# Patient Record
Sex: FEMALE | Race: OTHER | Employment: UNEMPLOYED | URBAN - METROPOLITAN AREA
[De-identification: names, ages, dates, MRNs, and addresses within clinical notes are randomized per-mention and may not be internally consistent; named-entity substitution may affect disease eponyms.]

---

## 2018-11-12 ENCOUNTER — HOSPITAL ENCOUNTER (OUTPATIENT)
Age: 33
Discharge: HOME | End: 2018-11-12
Attending: FAMILY MEDICINE
Payer: MEDICAID

## 2018-11-12 VITALS
BODY MASS INDEX: 23.92 KG/M2 | HEIGHT: 62 IN | OXYGEN SATURATION: 98 % | DIASTOLIC BLOOD PRESSURE: 62 MMHG | WEIGHT: 130 LBS | TEMPERATURE: 98 F | SYSTOLIC BLOOD PRESSURE: 108 MMHG | RESPIRATION RATE: 18 BRPM | HEART RATE: 78 BPM

## 2018-11-12 DIAGNOSIS — R20.2 NUMBNESS AND TINGLING IN LEFT ARM: Primary | ICD-10-CM

## 2018-11-12 DIAGNOSIS — R20.0 NUMBNESS AND TINGLING IN LEFT ARM: Primary | ICD-10-CM

## 2018-11-12 DIAGNOSIS — F41.9 ANXIETY: ICD-10-CM

## 2018-11-12 PROCEDURE — 93010 ELECTROCARDIOGRAM REPORT: CPT

## 2018-11-12 PROCEDURE — 80047 BASIC METABLC PNL IONIZED CA: CPT

## 2018-11-12 PROCEDURE — 85025 COMPLETE CBC W/AUTO DIFF WBC: CPT | Performed by: FAMILY MEDICINE

## 2018-11-12 PROCEDURE — 81025 URINE PREGNANCY TEST: CPT | Performed by: FAMILY MEDICINE

## 2018-11-12 PROCEDURE — 36415 COLL VENOUS BLD VENIPUNCTURE: CPT

## 2018-11-12 PROCEDURE — 99204 OFFICE O/P NEW MOD 45 MIN: CPT

## 2018-11-12 PROCEDURE — 93005 ELECTROCARDIOGRAM TRACING: CPT

## 2018-11-12 RX ORDER — MONTELUKAST SODIUM 10 MG/1
10 TABLET ORAL
COMMUNITY
Start: 2018-08-22

## 2018-11-12 NOTE — ED PROVIDER NOTES
Patient Seen in: [facility]    History   Patient presents with:  Dyspnea JULIO SOB (respiratory)    Stated Complaint: left arm tingling shortness of breathe nausea    HPI    This 27-year-old female presents to the office with comp normal.  EARS: Tympanic membranes normal, EAC's normal.  NOSE: Turbinates normal, no bleeding noted. PHARYNX:  No eythema or exudates, tonsils normal size, airway patent, uvula midline  NECK:  No cervical lymphadenopathy.  No thyromegaly,  HEART: Regular r appointment as soon as possible for a visit in 3 days  If symptoms worsen        Medications Prescribed:  Current Discharge Medication List         Your laboratory and EKG are completely normal.  I suspect that your symptoms are related to some hyperventil

## 2018-11-12 NOTE — ED INITIAL ASSESSMENT (HPI)
C/O left arm numbness that occurred when she was yelling at her kids. Sts that now her arm feels heavy. She also felt an itchy sensation in her throat, which she has had before.

## 2019-02-18 ENCOUNTER — HOSPITAL ENCOUNTER (OUTPATIENT)
Age: 34
Discharge: HOME | End: 2019-02-18
Attending: EMERGENCY MEDICINE
Payer: MEDICAID

## 2019-02-18 ENCOUNTER — APPOINTMENT (OUTPATIENT)
Dept: CT IMAGING | Age: 34
End: 2019-02-18
Attending: EMERGENCY MEDICINE
Payer: MEDICAID

## 2019-02-18 VITALS
HEIGHT: 62 IN | RESPIRATION RATE: 18 BRPM | TEMPERATURE: 98 F | WEIGHT: 132 LBS | DIASTOLIC BLOOD PRESSURE: 68 MMHG | BODY MASS INDEX: 24.29 KG/M2 | OXYGEN SATURATION: 99 % | HEART RATE: 82 BPM | SYSTOLIC BLOOD PRESSURE: 113 MMHG

## 2019-02-18 DIAGNOSIS — R10.9 ABDOMINAL PAIN OF UNKNOWN ETIOLOGY: Primary | ICD-10-CM

## 2019-02-18 LAB
#MXD IC: 1.2 X10ˆ3/UL (ref 0.1–1)
CREAT SERPL-MCNC: 0.6 MG/DL (ref 0.55–1.02)
GLUCOSE BLD-MCNC: 103 MG/DL (ref 70–99)
HCT VFR BLD AUTO: 39.9 % (ref 35–48)
HGB BLD-MCNC: 12.9 G/DL (ref 12–16)
ISTAT BUN: 11 MG/DL (ref 8–20)
ISTAT CHLORIDE: 103 MMOL/L (ref 101–111)
ISTAT HEMATOCRIT: 40 % (ref 34–50)
ISTAT IONIZED CALCIUM: 1.22 MMOL/L
ISTAT POTASSIUM: 3.7 MMOL/L (ref 3.6–5.1)
ISTAT SODIUM: 140 MMOL/L (ref 136–144)
LYMPHOCYTES # BLD AUTO: 2.6 X10ˆ3/UL (ref 1–4)
LYMPHOCYTES NFR BLD AUTO: 27.5 %
MCH RBC QN AUTO: 28.5 PG (ref 26–34)
MCHC RBC AUTO-ENTMCNC: 32.3 G/DL (ref 31–37)
MCV RBC AUTO: 88.1 FL (ref 80–100)
MIXED CELL %: 13 %
NEUTROPHILS # BLD AUTO: 5.5 X10ˆ3/UL (ref 1.5–7.7)
NEUTROPHILS NFR BLD AUTO: 59.5 %
PLATELET # BLD AUTO: 336 X10ˆ3/UL (ref 150–450)
POCT BILIRUBIN URINE: NEGATIVE
POCT GLUCOSE URINE: NEGATIVE MG/DL
POCT KETONE URINE: NEGATIVE MG/DL
POCT LEUKOCYTE ESTERASE URINE: NEGATIVE
POCT NITRITE URINE: NEGATIVE
POCT PH URINE: 6.5 (ref 5–8)
POCT PROTEIN URINE: NEGATIVE MG/DL
POCT SPECIFIC GRAVITY URINE: 1.02
POCT URINE CLARITY: CLEAR
POCT URINE COLOR: YELLOW
POCT URINE PREGNANCY: NEGATIVE
POCT UROBILINOGEN URINE: 0.2 MG/DL
RBC # BLD AUTO: 4.53 X10ˆ6/UL (ref 3.8–5.3)
WBC # BLD AUTO: 9.3 X10ˆ3/UL (ref 4–11)

## 2019-02-18 PROCEDURE — 99214 OFFICE O/P EST MOD 30 MIN: CPT

## 2019-02-18 PROCEDURE — 80047 BASIC METABLC PNL IONIZED CA: CPT

## 2019-02-18 PROCEDURE — 81002 URINALYSIS NONAUTO W/O SCOPE: CPT

## 2019-02-18 PROCEDURE — 85025 COMPLETE CBC W/AUTO DIFF WBC: CPT | Performed by: EMERGENCY MEDICINE

## 2019-02-18 PROCEDURE — 81025 URINE PREGNANCY TEST: CPT

## 2019-02-18 PROCEDURE — 99215 OFFICE O/P EST HI 40 MIN: CPT

## 2019-02-18 PROCEDURE — 74177 CT ABD & PELVIS W/CONTRAST: CPT | Performed by: EMERGENCY MEDICINE

## 2019-02-18 RX ORDER — PANTOPRAZOLE SODIUM 40 MG/1
40 TABLET, DELAYED RELEASE ORAL DAILY
Qty: 30 TABLET | Refills: 0 | Status: SHIPPED | OUTPATIENT
Start: 2019-02-18 | End: 2019-03-20

## 2019-02-19 NOTE — ED PROVIDER NOTES
Patient Seen in: [facility]    History   Patient presents with:  Abdominal Pain    Stated Complaint: stomach burning x 3 days    HPI    59-year-old female presents with abdominal pain.   She reports a burning sensation to the epi Sclerae anicteric. Conjunctivae show no pallor. Oropharynx clear, mucous membranes moist   Neck: supple, no rigidity   Lungs: good air exchange and clear   Heart: regular rate rhythm and no murmur   Abdomen:  Moderate tenderness on palpation of the epigas CONTRAST USED:  100cc of Omnipaque 350  FINDINGS: LUNG BASE:  Unremarkable. LIVER:  Unremarkable. BILIARY:  Unremarkable. SPLEEN:  Unremarkable. PANCREAS:  Unremarkable. ADRENALS:  Unremarkable. KIDNEYS:  Unremarkable. AORTA/VASCULAR:  Unremarkable.  RETROP information for further workup. She is nontoxic-appearing. Labs and CT scan unremarkable. Patient has symptoms that appear consistent with chronic gastritis. I will prescribe Protonix as the ranitidine does not seem to be working for her.   I have als

## 2019-02-19 NOTE — ED INITIAL ASSESSMENT (HPI)
C/o mid abdominal pain characterized as burning the past 6 months and the past 3 days got worse. Tried taking Ranitidine and Prilosec no relief. Denies nausea/vomiting/diarrhea.  Hx of taking Ibuprofen 800 MG three time a day the past year for knee and wris

## 2022-06-15 ENCOUNTER — HOSPITAL ENCOUNTER (OUTPATIENT)
Age: 37
Discharge: HOME | End: 2022-06-15
Payer: MEDICAID

## 2022-06-15 ENCOUNTER — APPOINTMENT (OUTPATIENT)
Dept: GENERAL RADIOLOGY | Age: 37
End: 2022-06-15
Attending: NURSE PRACTITIONER
Payer: MEDICAID

## 2022-06-15 VITALS
HEART RATE: 72 BPM | WEIGHT: 139 LBS | RESPIRATION RATE: 18 BRPM | BODY MASS INDEX: 25.58 KG/M2 | HEIGHT: 62 IN | SYSTOLIC BLOOD PRESSURE: 123 MMHG | OXYGEN SATURATION: 99 % | DIASTOLIC BLOOD PRESSURE: 75 MMHG | TEMPERATURE: 98 F

## 2022-06-15 DIAGNOSIS — S89.91XA INJURY OF RIGHT KNEE, INITIAL ENCOUNTER: Primary | ICD-10-CM

## 2022-06-15 PROCEDURE — 99213 OFFICE O/P EST LOW 20 MIN: CPT

## 2022-06-15 PROCEDURE — 73562 X-RAY EXAM OF KNEE 3: CPT | Performed by: NURSE PRACTITIONER

## 2022-06-15 NOTE — ED INITIAL ASSESSMENT (HPI)
Pt c/o R knee pain over the patella x1 week, pt denies injury but was moving furniture last week, pain goes down into ankle

## 2022-09-19 ENCOUNTER — HOSPITAL ENCOUNTER (OUTPATIENT)
Age: 37
Discharge: HOME | End: 2022-09-19
Attending: EMERGENCY MEDICINE

## 2022-09-19 ENCOUNTER — APPOINTMENT (OUTPATIENT)
Dept: GENERAL RADIOLOGY | Age: 37
End: 2022-09-19
Attending: EMERGENCY MEDICINE

## 2022-09-19 VITALS
RESPIRATION RATE: 16 BRPM | WEIGHT: 135 LBS | DIASTOLIC BLOOD PRESSURE: 63 MMHG | HEIGHT: 62 IN | HEART RATE: 86 BPM | SYSTOLIC BLOOD PRESSURE: 108 MMHG | BODY MASS INDEX: 24.84 KG/M2 | TEMPERATURE: 98 F | OXYGEN SATURATION: 97 %

## 2022-09-19 DIAGNOSIS — M25.559 HIP PAIN: ICD-10-CM

## 2022-09-19 DIAGNOSIS — G62.9 NEUROPATHY: Primary | ICD-10-CM

## 2022-09-19 PROCEDURE — 73502 X-RAY EXAM HIP UNI 2-3 VIEWS: CPT | Performed by: EMERGENCY MEDICINE

## 2022-09-19 PROCEDURE — 99213 OFFICE O/P EST LOW 20 MIN: CPT

## 2022-09-19 RX ORDER — METHYLPREDNISOLONE 4 MG/1
TABLET ORAL
Qty: 1 EACH | Refills: 0 | Status: SHIPPED | OUTPATIENT
Start: 2022-09-19

## 2022-09-19 RX ORDER — HYDROCODONE BITARTRATE AND ACETAMINOPHEN 5; 325 MG/1; MG/1
1-2 TABLET ORAL EVERY 6 HOURS PRN
Qty: 10 TABLET | Refills: 0 | Status: SHIPPED | OUTPATIENT
Start: 2022-09-19 | End: 2022-09-24

## 2022-09-19 NOTE — ED INITIAL ASSESSMENT (HPI)
States on Fri morning stood up from couch after falling asleep, pt c/o upper outer thigh pain. Having difficulty walking due to pain.

## 2023-12-12 ENCOUNTER — HOSPITAL ENCOUNTER (OUTPATIENT)
Age: 38
Discharge: HOME | End: 2023-12-12
Attending: EMERGENCY MEDICINE
Payer: MEDICAID

## 2023-12-12 VITALS
DIASTOLIC BLOOD PRESSURE: 80 MMHG | BODY MASS INDEX: 25 KG/M2 | HEART RATE: 58 BPM | TEMPERATURE: 98 F | WEIGHT: 134.5 LBS | SYSTOLIC BLOOD PRESSURE: 116 MMHG | RESPIRATION RATE: 20 BRPM | OXYGEN SATURATION: 100 %

## 2023-12-12 DIAGNOSIS — J06.9 VIRAL URI WITH COUGH: Primary | ICD-10-CM

## 2023-12-12 PROCEDURE — 99213 OFFICE O/P EST LOW 20 MIN: CPT

## 2023-12-12 RX ORDER — METHYLPREDNISOLONE 4 MG/1
TABLET ORAL
Qty: 1 EACH | Refills: 0 | Status: SHIPPED | OUTPATIENT
Start: 2023-12-12

## 2023-12-12 RX ORDER — BENZONATATE 100 MG/1
100 CAPSULE ORAL 3 TIMES DAILY PRN
Qty: 30 CAPSULE | Refills: 0 | Status: SHIPPED | OUTPATIENT
Start: 2023-12-12 | End: 2024-01-11

## 2023-12-12 NOTE — DISCHARGE INSTRUCTIONS
Follow-up with your primary care doctor  Use albuterol inhaler 2 puffs every 4 hours as needed  Take Tessalon Perles 3 times a day as needed for cough   take Medrol Dosepak as directed  Continue Tylenol or Motrin as needed for fever every 4 hours  Return if any worsening symptoms or new concerns

## 2024-04-30 ENCOUNTER — APPOINTMENT (OUTPATIENT)
Dept: ULTRASOUND IMAGING | Age: 39
End: 2024-04-30
Attending: EMERGENCY MEDICINE
Payer: MEDICAID

## 2024-04-30 ENCOUNTER — APPOINTMENT (OUTPATIENT)
Dept: CT IMAGING | Age: 39
End: 2024-04-30
Attending: EMERGENCY MEDICINE
Payer: MEDICAID

## 2024-04-30 ENCOUNTER — HOSPITAL ENCOUNTER (OUTPATIENT)
Age: 39
Discharge: HOME | End: 2024-04-30
Attending: EMERGENCY MEDICINE
Payer: MEDICAID

## 2024-04-30 VITALS
TEMPERATURE: 99 F | DIASTOLIC BLOOD PRESSURE: 68 MMHG | OXYGEN SATURATION: 98 % | BODY MASS INDEX: 25.76 KG/M2 | SYSTOLIC BLOOD PRESSURE: 108 MMHG | HEART RATE: 84 BPM | WEIGHT: 140 LBS | RESPIRATION RATE: 17 BRPM | HEIGHT: 62 IN

## 2024-04-30 DIAGNOSIS — R10.9 ABDOMINAL PAIN, RIGHT LATERAL: Primary | ICD-10-CM

## 2024-04-30 LAB
#MXD IC: 0.6 X10ˆ3/UL (ref 0.1–1)
B-HCG UR QL: NEGATIVE
BILIRUB UR QL STRIP: NEGATIVE
BUN BLD-MCNC: 12 MG/DL (ref 7–18)
CHLORIDE BLD-SCNC: 102 MMOL/L (ref 98–112)
CLARITY UR: CLEAR
CO2 BLD-SCNC: 25 MMOL/L (ref 21–32)
COLOR UR: YELLOW
CREAT BLD-MCNC: 0.5 MG/DL
EGFRCR SERPLBLD CKD-EPI 2021: 124 ML/MIN/1.73M2 (ref 60–?)
GLUCOSE BLD-MCNC: 104 MG/DL (ref 70–99)
GLUCOSE UR STRIP-MCNC: NEGATIVE MG/DL
HCT VFR BLD AUTO: 42 %
HCT VFR BLD CALC: 43 %
HGB BLD-MCNC: 13.6 G/DL
ISTAT IONIZED CALCIUM FOR CHEM 8: 1.25 MMOL/L (ref 1.12–1.32)
KETONES UR STRIP-MCNC: NEGATIVE MG/DL
LEUKOCYTE ESTERASE UR QL STRIP: NEGATIVE
LYMPHOCYTES # BLD AUTO: 3.2 X10ˆ3/UL (ref 1–4)
LYMPHOCYTES NFR BLD AUTO: 30.3 %
MCH RBC QN AUTO: 28.4 PG (ref 26–34)
MCHC RBC AUTO-ENTMCNC: 32.4 G/DL (ref 31–37)
MCV RBC AUTO: 87.7 FL (ref 80–100)
MIXED CELL %: 6 %
NEUTROPHILS # BLD AUTO: 6.7 X10ˆ3/UL (ref 1.5–7.7)
NEUTROPHILS NFR BLD AUTO: 63.7 %
NITRITE UR QL STRIP: NEGATIVE
PH UR STRIP: 6 [PH]
PLATELET # BLD AUTO: 453 X10ˆ3/UL (ref 150–450)
POTASSIUM BLD-SCNC: 4 MMOL/L (ref 3.6–5.1)
PROT UR STRIP-MCNC: NEGATIVE MG/DL
RBC # BLD AUTO: 4.79 X10ˆ6/UL
SODIUM BLD-SCNC: 138 MMOL/L (ref 136–145)
SP GR UR STRIP: 1.02
UROBILINOGEN UR STRIP-ACNC: <2 MG/DL
WBC # BLD AUTO: 10.5 X10ˆ3/UL (ref 4–11)

## 2024-04-30 PROCEDURE — 99214 OFFICE O/P EST MOD 30 MIN: CPT

## 2024-04-30 PROCEDURE — 81002 URINALYSIS NONAUTO W/O SCOPE: CPT

## 2024-04-30 PROCEDURE — 76700 US EXAM ABDOM COMPLETE: CPT | Performed by: EMERGENCY MEDICINE

## 2024-04-30 PROCEDURE — 85025 COMPLETE CBC W/AUTO DIFF WBC: CPT | Performed by: EMERGENCY MEDICINE

## 2024-04-30 PROCEDURE — 80047 BASIC METABLC PNL IONIZED CA: CPT

## 2024-04-30 PROCEDURE — 81025 URINE PREGNANCY TEST: CPT

## 2024-04-30 PROCEDURE — 74176 CT ABD & PELVIS W/O CONTRAST: CPT | Performed by: EMERGENCY MEDICINE

## 2024-04-30 PROCEDURE — 36415 COLL VENOUS BLD VENIPUNCTURE: CPT

## 2024-04-30 NOTE — ED INITIAL ASSESSMENT (HPI)
Pt began 3 days ago with rt sided under the rib pain that is painful in her abdomen and radiates to her back, and she feels bloated,  She has had 1 bout of vomiting

## 2024-04-30 NOTE — ED PROVIDER NOTES
Patient Seen in: [facility]      History     Chief Complaint   Patient presents with    Abdomen/Flank Pain     Stated Complaint: Side pain, lower back, urinary symptoms    Subjective:   HPI    Patient reports that symptoms started on Saturday.  Patient notes that she ate greasy pizza on Friday.  She thought her symptoms could be related to this.  She has never had similar discomforts in the past.  She does have a family history of gallbladder issues and a sister.  Patient complains of right-sided abdominal pain and points to the right mid abdomen at the anterior lateral aspect of the abdomen as location of discomfort.  She states that she began to feel some of the discomfort into her right flank yesterday.  There is no nausea associate with her symptoms.  She had an episode of emesis yesterday but attributes this to not having coffee which causes her to have headaches which cause her to vomit.  No diarrhea.  Patient did have some discomfort with urination just this morning.  No extraordinary urgency or frequency.  No rash in the area of her discomfort.  No chest pain.  No cough.  No painful breathing.  No fever    Objective:   Past Medical History:    Extrinsic asthma, unspecified              Past Surgical History:   Procedure Laterality Date    Sinus surgery    10/30/2013                Social History     Socioeconomic History    Marital status:    Tobacco Use    Smoking status: Never    Smokeless tobacco: Never   Vaping Use    Vaping status: Never Used   Substance and Sexual Activity    Alcohol use: Yes     Comment: occasional    Drug use: Never     Social Determinants of Health      Received from [facility], [facility]    Social Connections    Received from [facility], [facility]    Housing Stability              Review of Systems    Positive for stated complaint: Side pain, lower back, urinary  symptoms  Other systems are as noted in HPI.  Constitutional and vital signs reviewed.      All other systems reviewed and negative except as noted above.    Physical Exam     ED Triage Vitals [04/30/24 1739]   /67   Pulse 85   Resp 18   Temp 99.2 °F (37.3 °C)   Temp src Temporal   SpO2 97 %   O2 Device None (Room air)       Current:/67   Pulse 85   Temp 99.2 °F (37.3 °C) (Temporal)   Resp 18   Ht 157.5 cm (5' 2\")   Wt 63.5 kg   LMP 04/09/2024   SpO2 97%   BMI 25.61 kg/m²         Physical Exam  General: The patient is awake, alert, conversant.   Eyes: sclera white.  Lids and lashes are normal.  Heart: Normal S1 and S2, without murmur.   Abdomen: Soft, nondistended.  Tenderness noted in the right mid anterior lateral abdomen.  This area of tenderness seems well superior to McBurney's but not quite in the right upper quadrant.  No extraordinary CVA tenderness noted.  Skin: No rash in area patient discomfort  Neurologic:  Mental status as above.  Patient moves all extremities with good strength and coordination.           ED Course     Labs Reviewed   POCT CBC - Abnormal; Notable for the following components:       Result Value    PLT .0 (*)     All other components within normal limits   [facility] POCT URINALYSIS DIPSTICK - Abnormal; Notable for the following components:    Blood, Urine Trace-Intact (*)     All other components within normal limits   POCT ISTAT CHEM8 CARTRIDGE - Abnormal; Notable for the following components:    ISTAT Glucose 104 (*)     ISTAT Creatinine 0.50 (*)     All other components within normal limits   POCT PREGNANCY URINE - Normal                      [facility]     Patient complaining of right mid abdominal pain.  Patient notes symptoms started the day after eating greasy pizza and there is a family history of gallbladder disease.  Biliary colic include the differential.  No family history of kidney stones.  There is some pain in the flank however suggesting this is a  possibility.  Patient also reported some mild dysuria and pyelonephritis include the differential.    Urine dip shows high specific gravity but negative nitrites and leukocytes.  Trace blood noted.  Urine pregnancy negative  I-STAT shows normal electrolytes and creatinine  Hemogram shows normal white count, hemoglobin, and thrombocytosis requiring follow-up    Abdominal ultrasound  CONCLUSION:    1. Probable nonobstructing 4 mm calculus in the right lower pole.  No hydronephrosis.   2. Hepatic steatosis.   LIVER:  Increased echogenicity of the liver.  No focal lesion identified.   BILIARY:  Normal appearing gallbladder, intrahepatic ducts, and common bile duct.  Common bile duct diameter is 1 mm.     CT abdomen pelvis  CONCLUSION:    1. Minimal urinary bladder wall thickening, correlate for cystitis.   2. Diffuse hepatic steatosis.   KIDNEYS:  No mass, obstruction, or calcification.   BOWEL/MESENTERY:  No dilated bowel or wall thickening.  Normal appendix.     I reviewed the results of the workup with the patient.  At this point, I believe patient may be safely discharged home.  I recommend    Rest  Light diet  Tylenol or Motrin for pain  A warm compress to the abdominal wall may help                     Medical Decision Making      Disposition and Plan     Clinical Impression:  1. Abdominal pain, right lateral         Disposition:  Discharge  4/30/2024  7:17 pm    Follow-up:  Esther Goyal  [address]  [facility] [id number] [phone]    Call in 1 day            Medications Prescribed:  Current Discharge Medication List

## 2024-11-19 ENCOUNTER — HOSPITAL ENCOUNTER (OUTPATIENT)
Age: 39
Discharge: HOME | End: 2024-11-19
Payer: MEDICAID

## 2024-11-19 ENCOUNTER — APPOINTMENT (OUTPATIENT)
Dept: GENERAL RADIOLOGY | Age: 39
End: 2024-11-19
Attending: NURSE PRACTITIONER
Payer: MEDICAID

## 2024-11-19 VITALS
HEART RATE: 84 BPM | RESPIRATION RATE: 20 BRPM | DIASTOLIC BLOOD PRESSURE: 79 MMHG | TEMPERATURE: 98 F | OXYGEN SATURATION: 98 % | SYSTOLIC BLOOD PRESSURE: 103 MMHG

## 2024-11-19 DIAGNOSIS — J45.901 ASTHMA EXACERBATION, MILD (HCC): Primary | ICD-10-CM

## 2024-11-19 DIAGNOSIS — R06.00 DYSPNEA, UNSPECIFIED TYPE: ICD-10-CM

## 2024-11-19 LAB
#MXD IC: 0.5 X10ˆ3/UL (ref 0.1–1)
BUN BLD-MCNC: 9 MG/DL (ref 7–18)
CHLORIDE BLD-SCNC: 102 MMOL/L (ref 98–112)
CO2 BLD-SCNC: 26 MMOL/L (ref 21–32)
CREAT BLD-MCNC: 0.6 MG/DL
DDIMER WHOLE BLOOD: <200 NG/ML DDU (ref ?–400)
EGFRCR SERPLBLD CKD-EPI 2021: 118 ML/MIN/1.73M2 (ref 60–?)
GLUCOSE BLD-MCNC: 120 MG/DL (ref 70–99)
HCT VFR BLD AUTO: 41.9 %
HCT VFR BLD CALC: 44 %
HGB BLD-MCNC: 13.5 G/DL
ISTAT IONIZED CALCIUM FOR CHEM 8: 1.31 MMOL/L (ref 1.12–1.32)
LYMPHOCYTES # BLD AUTO: 2.2 X10ˆ3/UL (ref 1–4)
LYMPHOCYTES NFR BLD AUTO: 24.9 %
MCH RBC QN AUTO: 28.5 PG (ref 26–34)
MCHC RBC AUTO-ENTMCNC: 32.2 G/DL (ref 31–37)
MCV RBC AUTO: 88.4 FL (ref 80–100)
MIXED CELL %: 6.3 %
NEUTROPHILS # BLD AUTO: 6 X10ˆ3/UL (ref 1.5–7.7)
NEUTROPHILS NFR BLD AUTO: 68.8 %
PLATELET # BLD AUTO: 463 X10ˆ3/UL (ref 150–450)
POTASSIUM BLD-SCNC: 3.9 MMOL/L (ref 3.6–5.1)
RBC # BLD AUTO: 4.74 X10ˆ6/UL
SODIUM BLD-SCNC: 140 MMOL/L (ref 136–145)
TROPONIN I BLD-MCNC: <0.02 NG/ML
WBC # BLD AUTO: 8.7 X10ˆ3/UL (ref 4–11)

## 2024-11-19 PROCEDURE — 84484 ASSAY OF TROPONIN QUANT: CPT

## 2024-11-19 PROCEDURE — 85378 FIBRIN DEGRADE SEMIQUANT: CPT | Performed by: NURSE PRACTITIONER

## 2024-11-19 PROCEDURE — 85025 COMPLETE CBC W/AUTO DIFF WBC: CPT | Performed by: NURSE PRACTITIONER

## 2024-11-19 PROCEDURE — 93010 ELECTROCARDIOGRAM REPORT: CPT

## 2024-11-19 PROCEDURE — 80047 BASIC METABLC PNL IONIZED CA: CPT

## 2024-11-19 PROCEDURE — 71046 X-RAY EXAM CHEST 2 VIEWS: CPT | Performed by: NURSE PRACTITIONER

## 2024-11-19 PROCEDURE — 99214 OFFICE O/P EST MOD 30 MIN: CPT

## 2024-11-19 PROCEDURE — 36415 COLL VENOUS BLD VENIPUNCTURE: CPT

## 2024-11-19 PROCEDURE — 93005 ELECTROCARDIOGRAM TRACING: CPT

## 2024-11-19 PROCEDURE — 99215 OFFICE O/P EST HI 40 MIN: CPT

## 2024-11-19 RX ORDER — ALBUTEROL SULFATE 90 UG/1
2 INHALANT RESPIRATORY (INHALATION) EVERY 4 HOURS PRN
Qty: 1 EACH | Refills: 0 | Status: SHIPPED | OUTPATIENT
Start: 2024-11-19 | End: 2024-12-19

## 2024-11-19 RX ORDER — PREDNISONE 20 MG/1
20 TABLET ORAL DAILY
Qty: 3 TABLET | Refills: 0 | Status: SHIPPED | OUTPATIENT
Start: 2024-11-19 | End: 2024-11-22

## 2024-11-19 NOTE — ED INITIAL ASSESSMENT (HPI)
Pt was at work yesterday when they were removing carpeting, and she has asthma, and around 2 am in the morning became very SOB with Chest pain, and states it is hard to get a deep breath

## 2024-11-19 NOTE — ED PROVIDER NOTES
Patient Seen in: [facility]      History     Chief Complaint   Patient presents with    Difficulty Breathing     Stated Complaint: sob    Subjective:   HPI    39-year-old female presents today with complaints of shortness of breath that started yesterday.  Patient states that she she was at her place of employment and they were changing out the carpeting and this exacerbated her asthma.  Patient states that she uses her albuterol inhaler 4-5 times a day regularly.  Patient states she is supposed to be on a maintenance medication but due to the side effects of the maintenance medication she is not on them.      Objective:     Past Medical History:    Extrinsic asthma, unspecified              Past Surgical History:   Procedure Laterality Date    Sinus surgery    10/30/2013                Social History     Socioeconomic History    Marital status:    Tobacco Use    Smoking status: Never    Smokeless tobacco: Never   Vaping Use    Vaping status: Never Used   Substance and Sexual Activity    Alcohol use: Yes     Comment: occasional    Drug use: Never     Social Drivers of Health      Received from [facility], [facility]    Social Connections    Received from [facility], [facility]    Housing Stability              Review of Systems   Constitutional: Negative.    HENT: Negative.     Eyes: Negative.    Respiratory:  Positive for shortness of breath.    Cardiovascular: Negative.    Gastrointestinal: Negative.    Endocrine: Negative.    Genitourinary: Negative.    Musculoskeletal: Negative.    Skin: Negative.    Allergic/Immunologic: Negative.    Neurological: Negative.    Hematological: Negative.    Psychiatric/Behavioral: Negative.         Positive for stated complaint: sob  Other systems are as noted in HPI.  Constitutional and vital signs reviewed.      All other systems reviewed and negative except as noted  above.    Physical Exam     ED Triage Vitals [11/19/24 0926]   /88   Pulse 82   Resp 21   Temp 98.3 °F (36.8 °C)   Temp src Oral   SpO2 97 %   O2 Device None (Room air)       Current Vitals:   Vital Signs  BP: 123/88  Pulse: 82  Resp: 21  Temp: 98.3 °F (36.8 °C)  Temp src: Oral    Oxygen Therapy  SpO2: 97 %  O2 Device: None (Room air)        Physical Exam  Vitals and nursing note reviewed.   Constitutional:       Appearance: She is well-developed and normal weight.   HENT:      Head: Normocephalic.   Eyes:      Pupils: Pupils are equal, round, and reactive to light.   Cardiovascular:      Rate and Rhythm: Normal rate and regular rhythm.   Pulmonary:      Effort: Pulmonary effort is normal.      Breath sounds: Normal breath sounds.   Musculoskeletal:      Cervical back: Normal range of motion and neck supple.   Neurological:      General: No focal deficit present.      Mental Status: She is alert.   Psychiatric:         Mood and Affect: Mood normal.           ED Course     Labs Reviewed   POCT CBC - Abnormal; Notable for the following components:       Result Value    PLT .0 (*)     All other components within normal limits   POCT ISTAT CHEM8 CARTRIDGE - Abnormal; Notable for the following components:    ISTAT Glucose 120 (*)     All other components within normal limits   D-DIMER (POC) - Normal   ISTAT TROPONIN - Normal     EKG    Rate, intervals and axes as noted on EKG Report.  Rate: 84  Rhythm: Sinus Rhythm  Reading: NSR                       MDM      39-year-old female presents today with complaints of shortness of breath that started yesterday.  Patient states that she she was at her place of employment and they were changing out the carpeting and this exacerbated her asthma.  Patient states that she uses her albuterol inhaler 4-5 times a day regularly.  Patient states she is supposed to be on a maintenance medication but due to the side effects of the maintenance medication she is not on  them.  Vital signs: Please see EMR.  Physical exam: Please see exam.  Differential diagnosis: Asthma exacerbation, pneumonia, bronchitis, cardiac event, pulmonary embolism.    Heart Score:    HEART Score      Title      Criteria Score   Age: <45 Age Score: 0   History: Slightly Suspicious Hx Score: 0     EKG: Normal EKG Score: 0   HTN: No   Hypercholesterolemia: No   Atherosclerosis/PVD: No     DM: No   BMI>30kg/m2: No   Smoking: No   Family History: No         Other Risk Factor Score: 0               Lab Results   Component Value Date    ITROP <0.02 11/19/2024         HEART Score: 0        Risk of adverse cardiac event is 0.9-1.7%          PERC: PE can be ruled out.   Recent Results (from the past 24 hours)   POCT CBC    Collection Time: 11/19/24  9:39 AM   Result Value Ref Range    WBC IC 8.7 4.0 - 11.0 x10ˆ3/uL    RBC IC 4.74 3.80 - 5.30 X10ˆ6/uL    HGB IC 13.5 12.0 - 16.0 g/dL    HCT IC 41.9 35.0 - 48.0 %    MCV IC 88.4 80.0 - 100.0 fL    MCH IC 28.5 26.0 - 34.0 pg    MCHC IC 32.2 31.0 - 37.0 g/dL    PLT .0 (H) 150.0 - 450.0 X10ˆ3/uL    # Neutrophil 6.0 1.5 - 7.7 X10ˆ3/uL    # Lymphocyte 2.2 1.0 - 4.0 X10ˆ3/uL    # Mixed Cells 0.5 0.1 - 1.0 X10ˆ3/uL    Neutrophil % 68.8 %    Lymphocyte % 24.9 %    Mixed Cell % 6.3 %   D-Dimer,Triage    Collection Time: 11/19/24  9:39 AM   Result Value Ref Range    D-Dimer DDU <200 <400 ng/mL DDU   POCT ISTAT chem8 cartridge    Collection Time: 11/19/24  9:42 AM   Result Value Ref Range    ISTAT Sodium 140 136 - 145 mmol/L    ISTAT BUN 9 7 - 18 mg/dL    ISTAT Potassium 3.9 3.6 - 5.1 mmol/L    ISTAT Chloride 102 98 - 112 mmol/L    ISTAT Ionized Calcium 1.31 1.12 - 1.32 mmol/L    ISTAT Hematocrit 44 34 - 50 %    ISTAT Glucose 120 (H) 70 - 99 mg/dL    ISTAT TCO2 26 21 - 32 mmol/L    ISTAT Creatinine 0.60 0.55 - 1.02 mg/dL    eGFR-Cr 118 >=60 mL/min/1.73m2   ISTAT Troponin    Collection Time: 11/19/24  9:44 AM   Result Value Ref Range    ISTAT Troponin <0.02 <0.045 ng/mL ng/mL    EKG 12 Lead    Collection Time: 11/19/24  9:46 AM   Result Value Ref Range    Ventricular rate 84 BPM    Atrial rate 84 BPM    P-R Interval 128 ms    QRS Duration 84 ms    Q-T Interval 356 ms    QTC Calculation (Bezet) 420 ms    P Axis 61 degrees    R Axis -2 degrees    T Axis 37 degrees     XR CHEST PA + LAT CHEST (CPT=71046)    Result Date: 11/19/2024  CONCLUSION:  Faint rounded density at the left lung base, most likely related to a nipple shadow.  A nipple marker can be confirmatory.  No nodule or infiltrate was seen at the lung bases on the comparison CT scan.   LOCATION:  [facility]   Dictated by (CST): Joel Garcia MD on 11/19/2024 at 10:43 AM     Finalized by (CST): Joel Garcia MD on 11/19/2024 at 10:45 AM      Based on physical exam and HPI will diagnosed with asthma exacerbation.  Will prescribe a short course of steroid.  Patient is to follow-up with her primary care provider to discuss her asthma action plan.  ED precautions given.      Medical Decision Making  39-year-old female presents today with complaints of shortness of breath that started yesterday.  Patient states that she she was at her place of employment and they were changing out the carpeting and this exacerbated her asthma.  Patient states that she uses her albuterol inhaler 4-5 times a day regularly.  Patient states she is supposed to be on a maintenance medication but due to the side effects of the maintenance medication she is not on them.    Problems Addressed:  Asthma exacerbation, mild (HCC): acute illness or injury  Dyspnea, unspecified type: acute illness or injury    Amount and/or Complexity of Data Reviewed  Labs: ordered. Decision-making details documented in ED Course.  Radiology: ordered. Decision-making details documented in ED Course.  ECG/medicine tests: ordered. Decision-making details documented in ED Course.    Risk  Prescription drug management.        Disposition and Plan     Clinical Impression:  1. Asthma exacerbation,  mild (HCC)    2. Dyspnea, unspecified type         Disposition:  Discharge  11/19/2024 10:49 am    Follow-up:  Esther Goyal  [address] [address]  [phone]    In 1 week            Medications Prescribed:  Current Discharge Medication List        START taking these medications    Details   predniSONE 20 MG Oral Tab Take 1 tablet (20 mg total) by mouth daily for 3 days.  Qty: 3 tablet, Refills: 0      !! albuterol 108 (90 Base) MCG/ACT Inhalation Aero Soln Inhale 2 puffs into the lungs every 4 (four) hours as needed for Wheezing.  Qty: 1 each, Refills: 0       !! - Potential duplicate medications found. Please discuss with provider.              Supplementary Documentation:

## 2024-11-20 LAB
ATRIAL RATE: 84 BPM
P AXIS: 61 DEGREES
P-R INTERVAL: 128 MS
Q-T INTERVAL: 356 MS
QRS DURATION: 84 MS
QTC CALCULATION (BEZET): 420 MS
R AXIS: -2 DEGREES
T AXIS: 37 DEGREES
VENTRICULAR RATE: 84 BPM

## 2025-03-30 ENCOUNTER — HOSPITAL ENCOUNTER (OUTPATIENT)
Age: 40
Discharge: HOME | End: 2025-03-30
Payer: MEDICAID

## 2025-03-30 VITALS
SYSTOLIC BLOOD PRESSURE: 116 MMHG | HEIGHT: 62 IN | HEART RATE: 82 BPM | WEIGHT: 145 LBS | BODY MASS INDEX: 26.68 KG/M2 | DIASTOLIC BLOOD PRESSURE: 70 MMHG | RESPIRATION RATE: 18 BRPM | TEMPERATURE: 99 F | OXYGEN SATURATION: 99 %

## 2025-03-30 DIAGNOSIS — R21 RASH AND NONSPECIFIC SKIN ERUPTION: Primary | ICD-10-CM

## 2025-03-30 PROCEDURE — 99214 OFFICE O/P EST MOD 30 MIN: CPT

## 2025-03-30 PROCEDURE — 99213 OFFICE O/P EST LOW 20 MIN: CPT

## 2025-03-30 RX ORDER — TRIAMCINOLONE ACETONIDE 1 MG/G
1 CREAM TOPICAL 2 TIMES DAILY
Qty: 15 G | Refills: 0 | Status: SHIPPED | OUTPATIENT
Start: 2025-03-30 | End: 2025-04-06

## 2025-03-30 NOTE — ED INITIAL ASSESSMENT (HPI)
Right forearm - rash -  she states she burned  the area Thursday 03/20.  Applied neosporin and  ati fungal cream saturday

## 2025-03-30 NOTE — ED PROVIDER NOTES
Patient Seen in: [facility]      History     Chief Complaint   Patient presents with    Derm Problem     Stated Complaint: Fungal Inf    Subjective:   This a 39-year-old female with no significant past medical history.  Presents to immediate care for rash on her right forearm.  Reports she burned her forearm approximately 10 days ago.  She was using Neosporin and a Band-Aid.  States she thought she developed a fungal rash.  She has been using antifungals and Neosporin.  Reports area is itchy.  No new soaps, detergents, foods, or drinks.  No other treatment attempted prior to arrival.     The history is provided by the patient.             Objective:     Past Medical History:    Extrinsic asthma, unspecified              Past Surgical History:   Procedure Laterality Date    Sinus surgery    10/30/2013                Social History     Socioeconomic History    Marital status:    Tobacco Use    Smoking status: Never    Smokeless tobacco: Never   Vaping Use    Vaping status: Never Used   Substance and Sexual Activity    Alcohol use: Yes     Comment: occasional    Drug use: Never     Social Drivers of Health      Received from [facility], [facility]    Housing Stability              Review of Systems   Constitutional:  Negative for chills and fever.   HENT:  Negative for congestion and sore throat.    Respiratory:  Negative for cough.    Cardiovascular:  Negative for chest pain.   Gastrointestinal:  Negative for abdominal pain, diarrhea, nausea and vomiting.   Genitourinary:  Negative for dysuria.   Musculoskeletal:  Negative for back pain, neck pain and neck stiffness.   Skin:  Positive for rash.   Allergic/Immunologic: Negative for environmental allergies and food allergies.   Neurological:  Negative for headaches.       Positive for stated complaint: Fungal Inf  Other systems are as noted in HPI.  Constitutional and vital signs reviewed.      All other  systems reviewed and negative except as noted above.    Physical Exam     ED Triage Vitals [03/30/25 1354]   /70   Pulse 82   Resp 18   Temp 98.8 °F (37.1 °C)   Temp src Oral   SpO2 99 %   O2 Device None (Room air)       Current Vitals:   Vital Signs  BP: 116/70  Pulse: 82  Resp: 18  Temp: 98.8 °F (37.1 °C)  Temp src: Oral    Oxygen Therapy  SpO2: 99 %  O2 Device: None (Room air)        Physical Exam  Vitals and nursing note reviewed.   Constitutional:       General: She is not in acute distress.     Appearance: Normal appearance. She is not ill-appearing, toxic-appearing or diaphoretic.   HENT:      Head: Normocephalic and atraumatic.      Right Ear: External ear normal.      Left Ear: External ear normal.      Nose: Nose normal.      Mouth/Throat:      Mouth: Mucous membranes are moist.      Pharynx: Oropharynx is clear.   Eyes:      General:         Right eye: No discharge.         Left eye: No discharge.      Extraocular Movements: Extraocular movements intact.      Conjunctiva/sclera: Conjunctivae normal.   Cardiovascular:      Rate and Rhythm: Normal rate.   Pulmonary:      Effort: Pulmonary effort is normal.   Musculoskeletal:      Cervical back: Neck supple.      Right lower leg: No edema.      Left lower leg: No edema.   Skin:     General: Skin is warm and dry.      Capillary Refill: Capillary refill takes less than 2 seconds.      Findings: Rash present.          Neurological:      Mental Status: She is alert and oriented to person, place, and time.   Psychiatric:         Mood and Affect: Mood normal.         Behavior: Behavior normal.             ED Course   Labs Reviewed - No data to display         DC home.        MDM        Vital signs stable.  Patient is well-appearing and nontoxic looking.  Presents to immediate care for itchy rash on the right forearm.    Differential diagnosis includes but is not limited to contact dermatitis, burn, cellulitis, abscess, shingles, atopic dermatitis,  tinea    There is no evidence of underlying infectious process or cellulitis.  Rash is not vesicular, no suspicion for shingles or tinea.    Area is nonspecific but appears to be crusted over and healing.    DC home.  Triamcinolone cream prescribed.  Avoid scratching and rubbing at the area.  Recommended close follow-up with her primary.  She verbalized understanding, and agreed with plan of care.  All questions answered.    Medical Decision Making      Disposition and Plan     Clinical Impression:  1. Rash and nonspecific skin eruption         Disposition:  Discharge  3/30/2025  2:08 pm    Follow-up:  Esther Goyal  [address] [address]  [phone]    In 1 week            Medications Prescribed:  Current Discharge Medication List        START taking these medications    Details   triamcinolone 0.1 % External Cream Apply 1 Application topically 2 (two) times daily for 7 days.  Qty: 15 g, Refills: 0                 Supplementary Documentation:

## 2025-03-30 NOTE — DISCHARGE INSTRUCTIONS
Please use triamcinolone cream as prescribed.  Do not scrub or itch at the area.  Follow closely with your primary as discussed.